# Patient Record
Sex: FEMALE | Race: OTHER | HISPANIC OR LATINO | URBAN - METROPOLITAN AREA
[De-identification: names, ages, dates, MRNs, and addresses within clinical notes are randomized per-mention and may not be internally consistent; named-entity substitution may affect disease eponyms.]

---

## 2024-01-03 ENCOUNTER — EMERGENCY (EMERGENCY)
Facility: HOSPITAL | Age: 64
LOS: 0 days | Discharge: HOME | End: 2024-01-04
Attending: EMERGENCY MEDICINE
Payer: SELF-PAY

## 2024-01-03 VITALS
OXYGEN SATURATION: 98 % | HEART RATE: 65 BPM | DIASTOLIC BLOOD PRESSURE: 78 MMHG | TEMPERATURE: 98 F | SYSTOLIC BLOOD PRESSURE: 160 MMHG | RESPIRATION RATE: 18 BRPM

## 2024-01-03 DIAGNOSIS — R51.9 HEADACHE, UNSPECIFIED: ICD-10-CM

## 2024-01-03 DIAGNOSIS — R42 DIZZINESS AND GIDDINESS: ICD-10-CM

## 2024-01-03 DIAGNOSIS — R55 SYNCOPE AND COLLAPSE: ICD-10-CM

## 2024-01-03 DIAGNOSIS — R07.1 CHEST PAIN ON BREATHING: ICD-10-CM

## 2024-01-03 DIAGNOSIS — I10 ESSENTIAL (PRIMARY) HYPERTENSION: ICD-10-CM

## 2024-01-03 DIAGNOSIS — Z98.891 HISTORY OF UTERINE SCAR FROM PREVIOUS SURGERY: Chronic | ICD-10-CM

## 2024-01-03 DIAGNOSIS — R06.02 SHORTNESS OF BREATH: ICD-10-CM

## 2024-01-03 LAB
ALBUMIN SERPL ELPH-MCNC: 4.4 G/DL — SIGNIFICANT CHANGE UP (ref 3.5–5.2)
ALBUMIN SERPL ELPH-MCNC: 4.4 G/DL — SIGNIFICANT CHANGE UP (ref 3.5–5.2)
ALP SERPL-CCNC: 108 U/L — SIGNIFICANT CHANGE UP (ref 30–115)
ALP SERPL-CCNC: 108 U/L — SIGNIFICANT CHANGE UP (ref 30–115)
ALT FLD-CCNC: 17 U/L — SIGNIFICANT CHANGE UP (ref 0–41)
ALT FLD-CCNC: 17 U/L — SIGNIFICANT CHANGE UP (ref 0–41)
ANION GAP SERPL CALC-SCNC: 10 MMOL/L — SIGNIFICANT CHANGE UP (ref 7–14)
ANION GAP SERPL CALC-SCNC: 10 MMOL/L — SIGNIFICANT CHANGE UP (ref 7–14)
AST SERPL-CCNC: 16 U/L — SIGNIFICANT CHANGE UP (ref 0–41)
AST SERPL-CCNC: 16 U/L — SIGNIFICANT CHANGE UP (ref 0–41)
BASOPHILS # BLD AUTO: 0.03 K/UL — SIGNIFICANT CHANGE UP (ref 0–0.2)
BASOPHILS # BLD AUTO: 0.03 K/UL — SIGNIFICANT CHANGE UP (ref 0–0.2)
BASOPHILS NFR BLD AUTO: 0.6 % — SIGNIFICANT CHANGE UP (ref 0–1)
BASOPHILS NFR BLD AUTO: 0.6 % — SIGNIFICANT CHANGE UP (ref 0–1)
BILIRUB SERPL-MCNC: 0.3 MG/DL — SIGNIFICANT CHANGE UP (ref 0.2–1.2)
BILIRUB SERPL-MCNC: 0.3 MG/DL — SIGNIFICANT CHANGE UP (ref 0.2–1.2)
BUN SERPL-MCNC: 10 MG/DL — SIGNIFICANT CHANGE UP (ref 10–20)
BUN SERPL-MCNC: 10 MG/DL — SIGNIFICANT CHANGE UP (ref 10–20)
CALCIUM SERPL-MCNC: 9.5 MG/DL — SIGNIFICANT CHANGE UP (ref 8.4–10.4)
CALCIUM SERPL-MCNC: 9.5 MG/DL — SIGNIFICANT CHANGE UP (ref 8.4–10.4)
CHLORIDE SERPL-SCNC: 107 MMOL/L — SIGNIFICANT CHANGE UP (ref 98–110)
CHLORIDE SERPL-SCNC: 107 MMOL/L — SIGNIFICANT CHANGE UP (ref 98–110)
CO2 SERPL-SCNC: 26 MMOL/L — SIGNIFICANT CHANGE UP (ref 17–32)
CO2 SERPL-SCNC: 26 MMOL/L — SIGNIFICANT CHANGE UP (ref 17–32)
CREAT SERPL-MCNC: 0.6 MG/DL — LOW (ref 0.7–1.5)
CREAT SERPL-MCNC: 0.6 MG/DL — LOW (ref 0.7–1.5)
D DIMER BLD IA.RAPID-MCNC: 272 NG/ML DDU — HIGH
D DIMER BLD IA.RAPID-MCNC: 272 NG/ML DDU — HIGH
EGFR: 101 ML/MIN/1.73M2 — SIGNIFICANT CHANGE UP
EGFR: 101 ML/MIN/1.73M2 — SIGNIFICANT CHANGE UP
EOSINOPHIL # BLD AUTO: 0.08 K/UL — SIGNIFICANT CHANGE UP (ref 0–0.7)
EOSINOPHIL # BLD AUTO: 0.08 K/UL — SIGNIFICANT CHANGE UP (ref 0–0.7)
EOSINOPHIL NFR BLD AUTO: 1.5 % — SIGNIFICANT CHANGE UP (ref 0–8)
EOSINOPHIL NFR BLD AUTO: 1.5 % — SIGNIFICANT CHANGE UP (ref 0–8)
GLUCOSE SERPL-MCNC: 99 MG/DL — SIGNIFICANT CHANGE UP (ref 70–99)
GLUCOSE SERPL-MCNC: 99 MG/DL — SIGNIFICANT CHANGE UP (ref 70–99)
HCT VFR BLD CALC: 40.7 % — SIGNIFICANT CHANGE UP (ref 37–47)
HCT VFR BLD CALC: 40.7 % — SIGNIFICANT CHANGE UP (ref 37–47)
HGB BLD-MCNC: 14 G/DL — SIGNIFICANT CHANGE UP (ref 12–16)
HGB BLD-MCNC: 14 G/DL — SIGNIFICANT CHANGE UP (ref 12–16)
IMM GRANULOCYTES NFR BLD AUTO: 0.4 % — HIGH (ref 0.1–0.3)
IMM GRANULOCYTES NFR BLD AUTO: 0.4 % — HIGH (ref 0.1–0.3)
LYMPHOCYTES # BLD AUTO: 1.68 K/UL — SIGNIFICANT CHANGE UP (ref 1.2–3.4)
LYMPHOCYTES # BLD AUTO: 1.68 K/UL — SIGNIFICANT CHANGE UP (ref 1.2–3.4)
LYMPHOCYTES # BLD AUTO: 30.9 % — SIGNIFICANT CHANGE UP (ref 20.5–51.1)
LYMPHOCYTES # BLD AUTO: 30.9 % — SIGNIFICANT CHANGE UP (ref 20.5–51.1)
MAGNESIUM SERPL-MCNC: 2.1 MG/DL — SIGNIFICANT CHANGE UP (ref 1.8–2.4)
MAGNESIUM SERPL-MCNC: 2.1 MG/DL — SIGNIFICANT CHANGE UP (ref 1.8–2.4)
MCHC RBC-ENTMCNC: 32.1 PG — HIGH (ref 27–31)
MCHC RBC-ENTMCNC: 32.1 PG — HIGH (ref 27–31)
MCHC RBC-ENTMCNC: 34.4 G/DL — SIGNIFICANT CHANGE UP (ref 32–37)
MCHC RBC-ENTMCNC: 34.4 G/DL — SIGNIFICANT CHANGE UP (ref 32–37)
MCV RBC AUTO: 93.3 FL — SIGNIFICANT CHANGE UP (ref 81–99)
MCV RBC AUTO: 93.3 FL — SIGNIFICANT CHANGE UP (ref 81–99)
MONOCYTES # BLD AUTO: 0.34 K/UL — SIGNIFICANT CHANGE UP (ref 0.1–0.6)
MONOCYTES # BLD AUTO: 0.34 K/UL — SIGNIFICANT CHANGE UP (ref 0.1–0.6)
MONOCYTES NFR BLD AUTO: 6.3 % — SIGNIFICANT CHANGE UP (ref 1.7–9.3)
MONOCYTES NFR BLD AUTO: 6.3 % — SIGNIFICANT CHANGE UP (ref 1.7–9.3)
NEUTROPHILS # BLD AUTO: 3.29 K/UL — SIGNIFICANT CHANGE UP (ref 1.4–6.5)
NEUTROPHILS # BLD AUTO: 3.29 K/UL — SIGNIFICANT CHANGE UP (ref 1.4–6.5)
NEUTROPHILS NFR BLD AUTO: 60.3 % — SIGNIFICANT CHANGE UP (ref 42.2–75.2)
NEUTROPHILS NFR BLD AUTO: 60.3 % — SIGNIFICANT CHANGE UP (ref 42.2–75.2)
NRBC # BLD: 0 /100 WBCS — SIGNIFICANT CHANGE UP (ref 0–0)
NRBC # BLD: 0 /100 WBCS — SIGNIFICANT CHANGE UP (ref 0–0)
NT-PROBNP SERPL-SCNC: 109 PG/ML — SIGNIFICANT CHANGE UP (ref 0–300)
NT-PROBNP SERPL-SCNC: 109 PG/ML — SIGNIFICANT CHANGE UP (ref 0–300)
PLATELET # BLD AUTO: 203 K/UL — SIGNIFICANT CHANGE UP (ref 130–400)
PLATELET # BLD AUTO: 203 K/UL — SIGNIFICANT CHANGE UP (ref 130–400)
PMV BLD: 9.6 FL — SIGNIFICANT CHANGE UP (ref 7.4–10.4)
PMV BLD: 9.6 FL — SIGNIFICANT CHANGE UP (ref 7.4–10.4)
POTASSIUM SERPL-MCNC: 4.7 MMOL/L — SIGNIFICANT CHANGE UP (ref 3.5–5)
POTASSIUM SERPL-MCNC: 4.7 MMOL/L — SIGNIFICANT CHANGE UP (ref 3.5–5)
POTASSIUM SERPL-SCNC: 4.7 MMOL/L — SIGNIFICANT CHANGE UP (ref 3.5–5)
POTASSIUM SERPL-SCNC: 4.7 MMOL/L — SIGNIFICANT CHANGE UP (ref 3.5–5)
PROT SERPL-MCNC: 7.6 G/DL — SIGNIFICANT CHANGE UP (ref 6–8)
PROT SERPL-MCNC: 7.6 G/DL — SIGNIFICANT CHANGE UP (ref 6–8)
RBC # BLD: 4.36 M/UL — SIGNIFICANT CHANGE UP (ref 4.2–5.4)
RBC # BLD: 4.36 M/UL — SIGNIFICANT CHANGE UP (ref 4.2–5.4)
RBC # FLD: 12.5 % — SIGNIFICANT CHANGE UP (ref 11.5–14.5)
RBC # FLD: 12.5 % — SIGNIFICANT CHANGE UP (ref 11.5–14.5)
SODIUM SERPL-SCNC: 143 MMOL/L — SIGNIFICANT CHANGE UP (ref 135–146)
SODIUM SERPL-SCNC: 143 MMOL/L — SIGNIFICANT CHANGE UP (ref 135–146)
TROPONIN T, HIGH SENSITIVITY RESULT: 7 NG/L — SIGNIFICANT CHANGE UP (ref 6–13)
TROPONIN T, HIGH SENSITIVITY RESULT: 7 NG/L — SIGNIFICANT CHANGE UP (ref 6–13)
WBC # BLD: 5.44 K/UL — SIGNIFICANT CHANGE UP (ref 4.8–10.8)
WBC # BLD: 5.44 K/UL — SIGNIFICANT CHANGE UP (ref 4.8–10.8)
WBC # FLD AUTO: 5.44 K/UL — SIGNIFICANT CHANGE UP (ref 4.8–10.8)
WBC # FLD AUTO: 5.44 K/UL — SIGNIFICANT CHANGE UP (ref 4.8–10.8)

## 2024-01-03 PROCEDURE — G0378: CPT

## 2024-01-03 PROCEDURE — 71045 X-RAY EXAM CHEST 1 VIEW: CPT | Mod: 26

## 2024-01-03 PROCEDURE — 99285 EMERGENCY DEPT VISIT HI MDM: CPT

## 2024-01-03 PROCEDURE — 80053 COMPREHEN METABOLIC PANEL: CPT

## 2024-01-03 PROCEDURE — 85379 FIBRIN DEGRADATION QUANT: CPT

## 2024-01-03 PROCEDURE — 85025 COMPLETE CBC W/AUTO DIFF WBC: CPT

## 2024-01-03 PROCEDURE — 70450 CT HEAD/BRAIN W/O DYE: CPT | Mod: MA

## 2024-01-03 PROCEDURE — 99223 1ST HOSP IP/OBS HIGH 75: CPT

## 2024-01-03 PROCEDURE — 71275 CT ANGIOGRAPHY CHEST: CPT | Mod: MA

## 2024-01-03 PROCEDURE — 84484 ASSAY OF TROPONIN QUANT: CPT

## 2024-01-03 PROCEDURE — 93010 ELECTROCARDIOGRAM REPORT: CPT | Mod: 77

## 2024-01-03 PROCEDURE — 93010 ELECTROCARDIOGRAM REPORT: CPT

## 2024-01-03 PROCEDURE — 71045 X-RAY EXAM CHEST 1 VIEW: CPT

## 2024-01-03 PROCEDURE — 78452 HT MUSCLE IMAGE SPECT MULT: CPT | Mod: MA

## 2024-01-03 PROCEDURE — A9500: CPT

## 2024-01-03 PROCEDURE — 93306 TTE W/DOPPLER COMPLETE: CPT

## 2024-01-03 PROCEDURE — 93017 CV STRESS TEST TRACING ONLY: CPT

## 2024-01-03 PROCEDURE — 36415 COLL VENOUS BLD VENIPUNCTURE: CPT

## 2024-01-03 PROCEDURE — 83735 ASSAY OF MAGNESIUM: CPT

## 2024-01-03 PROCEDURE — 70450 CT HEAD/BRAIN W/O DYE: CPT | Mod: 26,MA

## 2024-01-03 PROCEDURE — 83880 ASSAY OF NATRIURETIC PEPTIDE: CPT

## 2024-01-03 PROCEDURE — 93005 ELECTROCARDIOGRAM TRACING: CPT

## 2024-01-03 PROCEDURE — 71275 CT ANGIOGRAPHY CHEST: CPT | Mod: 26,MA

## 2024-01-03 RX ORDER — ADENOSINE 3 MG/ML
60 INJECTION INTRAVENOUS ONCE
Refills: 0 | Status: DISCONTINUED | OUTPATIENT
Start: 2024-01-03 | End: 2024-01-04

## 2024-01-03 RX ORDER — LOSARTAN POTASSIUM 100 MG/1
50 TABLET, FILM COATED ORAL DAILY
Refills: 0 | Status: DISCONTINUED | OUTPATIENT
Start: 2024-01-04 | End: 2024-01-04

## 2024-01-03 RX ORDER — AMLODIPINE BESYLATE 2.5 MG/1
5 TABLET ORAL DAILY
Refills: 0 | Status: DISCONTINUED | OUTPATIENT
Start: 2024-01-04 | End: 2024-01-04

## 2024-01-03 NOTE — ED PROVIDER NOTE - ATTENDING APP SHARED VISIT CONTRIBUTION OF CARE
64 yo f hx htn  pt presents for eval of syncope. syncopal event occurred at 2pm.  pt states she felt frontal headache, dizzy and L sided cp during event. cp worse w/ inspiration. last cardiac eval was 3 yrs ago.  no ap, n,v,d,neck pain. no focal numbness/weakness.      vss  gen- NAD, aaox3  card-rrr  lungs-ctab, no wheezing or rhonchi  abd-sntnd, no guarding or rebound  neuro- full str/sensation, cn ii-xii grossly intact, normal coordination   LE- no calf pain/swelling/tenderness b/l 62 yo f hx htn  pt presents for eval of syncope. syncopal event occurred at 2pm.  pt states she felt frontal headache, dizzy and L sided cp during event. cp worse w/ inspiration. last cardiac eval was 3 yrs ago.  no ap, n,v,d,neck pain. no focal numbness/weakness.      vss  gen- NAD, aaox3  card-rrr  lungs-ctab, no wheezing or rhonchi  abd-sntnd, no guarding or rebound  neuro- full str/sensation, cn ii-xii grossly intact, normal coordination   LE- no calf pain/swelling/tenderness b/l

## 2024-01-03 NOTE — ED ADULT NURSE NOTE - NSFALLUNIVINTERV_ED_ALL_ED
Bed/Stretcher in lowest position, wheels locked, appropriate side rails in place/Call bell, personal items and telephone in reach/Instruct patient to call for assistance before getting out of bed/chair/stretcher/Non-slip footwear applied when patient is off stretcher/[place] to call system/Physically safe environment - no spills, clutter or unnecessary equipment/Purposeful proactive rounding/Room/bathroom lighting operational, light cord in reach Bed/Stretcher in lowest position, wheels locked, appropriate side rails in place/Call bell, personal items and telephone in reach/Instruct patient to call for assistance before getting out of bed/chair/stretcher/Non-slip footwear applied when patient is off stretcher/[place] to call system/Physically safe environment - no spills, clutter or unnecessary equipment/Purposeful proactive rounding/Room/bathroom lighting operational, light cord in reach

## 2024-01-03 NOTE — ED PROVIDER NOTE - OBJECTIVE STATEMENT
I used  Shasha [id number].  63-year-old Ugandan-speaking female with a past medical history of hypertension presents to the ED for evaluation of syncopal episode that occurred at 2 PM while at her daughter's house.  Patient reports frontal headache, dizziness, and constant left-sided pleuritic chest pain that began today.  Patient reports she last saw her cardiologist in [place] about 3 years ago.  Patient reports she had a similar episode but at that time and her cardiologist did "tests on her arm and started her on telmisartan 40 mg once daily, and amlodipine 5 mg once daily."Patient reports she has shortness of breath earlier today.  Patient denies visual changes, neck pain, back pain, abdominal pain, nausea, vomiting, diarrhea, constipation, weakness, numbness, tingling, urinary or bowel retention or incontinence, recent head trauma, family history of CAD, cigarette smoking, or use of blood thinners. I used  Shasha [id number].  63-year-old Scottish-speaking female with a past medical history of hypertension presents to the ED for evaluation of syncopal episode that occurred at 2 PM while at her daughter's house.  Patient reports frontal headache, dizziness, and constant left-sided pleuritic chest pain that began today.  Patient reports she last saw her cardiologist in [place] about 3 years ago.  Patient reports she had a similar episode but at that time and her cardiologist did "tests on her arm and started her on telmisartan 40 mg once daily, and amlodipine 5 mg once daily."Patient reports she has shortness of breath earlier today.  Patient denies visual changes, neck pain, back pain, abdominal pain, nausea, vomiting, diarrhea, constipation, weakness, numbness, tingling, urinary or bowel retention or incontinence, recent head trauma, family history of CAD, cigarette smoking, or use of blood thinners. I used  Shasha [id number].  63-year-old Japanese-speaking female with a past medical history of hypertension presents to the ED for evaluation of syncopal episode that occurred at 2 PM while at her daughter's house.  Patient reports frontal headache, dizziness, and constant left-sided pleuritic chest pain that began today.  Patient reports she last saw her cardiologist in [place] about 3 years ago.  Patient reports she had a similar episode but at that time and her cardiologist did "tests on her arm and started her on telmisartan 40 mg once daily, and amlodipine 5 mg once daily."Patient reports she has shortness of breath earlier today.  Patient denies visual changes, neck pain, back pain, abdominal pain, nausea, vomiting, diarrhea, constipation, weakness, numbness, tingling, urinary or bowel retention or incontinence, recent head trauma, family history of CAD, cigarette smoking, or use of blood thinners.

## 2024-01-03 NOTE — ED CDU PROVIDER INITIAL DAY NOTE - ATTENDING SHARED VISIT SELECTOR YES
on active chemotherapy monitoring and management of side effects, cytopenias, infections, bleeding and other complications.
Yes

## 2024-01-03 NOTE — ED CDU PROVIDER INITIAL DAY NOTE - CLINICAL SUMMARY MEDICAL DECISION MAKING FREE TEXT BOX
no 53-year-old woman with history of hypertension was placed in CDU for evaluation of syncope after headache associated with pleuritic chest pain.  Patient is visiting from [place].  Had a similar episode about 3 years ago after which she was started on antihypertensives.  ED workup was unremarkable.  Patient comfortable on my eval.  Exam as noted.  Plan is for telemetry, serial EKG and enzymes, stress test, echo. 53-year-old woman with history of hypertension was placed in CDU for evaluation of syncope after headache associated with pleuritic chest pain.  Patient is visiting from [place].  Had a similar episode about 3 years ago after which she was started on antihypertensives.  ED workup was unremarkable.  Patient comfortable on my eval.  Exam as noted.  Plan is for telemetry, serial EKG and enzymes, stress test, echo.

## 2024-01-03 NOTE — ED CDU PROVIDER INITIAL DAY NOTE - PHYSICAL EXAMINATION
Physical Exam    Vital Signs: I have reviewed the initial vital signs.  Constitutional: well-nourished, appears stated age, no acute distress  Eyes: Conjunctiva pink, Sclera clear, PERRLA, EOMI without pain. no nystagmus.   Cardiovascular: S1 and S2, regular rate, regular rhythm, well-perfused extremities, radial pulses equal and 2+ b/l.   Respiratory: unlabored respiratory effort, clear to auscultation bilaterally no wheezing, rales and rhonchi. pt is speaking full sentences. no accessory muscle use. no chest wall crepitus or tenderness. no flail chest.   Gastrointestinal: soft, non-tender, nondistended abdomen, no pulsatile mass, no rebound, no guarding  Musculoskeletal: FROM of b/l upper and lower extremities, no lower extremity edema, no calf tenderness  Integumentary: warm, dry, no rash  Neurologic: awake, alert, cranial nerves II-XII grossly intact, extremities’ motor and sensory functions grossly intact. finger to nose intact. negative pronator drift. 5/5 strength throughout.   Psychiatric: appropriate mood, appropriate affect

## 2024-01-03 NOTE — ED CDU PROVIDER INITIAL DAY NOTE - OBJECTIVE STATEMENT
I used  Shasha [id number].  63-year-old Costa Rican-speaking female with a past medical history of hypertension presents to the ED for evaluation of syncopal episode that occurred at 2 PM while at her daughter's house.  Patient reports frontal headache, dizziness, and constant left-sided pleuritic chest pain that began today.  Patient reports she last saw her cardiologist in [place] about 3 years ago.  Patient reports she had a similar episode but at that time and her cardiologist did "tests on her arm and started her on telmisartan 40 mg once daily, and amlodipine 5 mg once daily."Patient reports she has shortness of breath earlier today.  Patient denies visual changes, neck pain, back pain, abdominal pain, nausea, vomiting, diarrhea, constipation, weakness, numbness, tingling, urinary or bowel retention or incontinence, recent head trauma, family history of CAD, cigarette smoking, or use of blood thinners. I used  Shasha [id number].  63-year-old Slovenian-speaking female with a past medical history of hypertension presents to the ED for evaluation of syncopal episode that occurred at 2 PM while at her daughter's house.  Patient reports frontal headache, dizziness, and constant left-sided pleuritic chest pain that began today.  Patient reports she last saw her cardiologist in [place] about 3 years ago.  Patient reports she had a similar episode but at that time and her cardiologist did "tests on her arm and started her on telmisartan 40 mg once daily, and amlodipine 5 mg once daily."Patient reports she has shortness of breath earlier today.  Patient denies visual changes, neck pain, back pain, abdominal pain, nausea, vomiting, diarrhea, constipation, weakness, numbness, tingling, urinary or bowel retention or incontinence, recent head trauma, family history of CAD, cigarette smoking, or use of blood thinners.

## 2024-01-03 NOTE — ED PROVIDER NOTE - CLINICAL SUMMARY MEDICAL DECISION MAKING FREE TEXT BOX
Throughout ED observation period, pt remained clinically and hemodynamically stable.  labs w/o acute findings, dimer mildly elevated but cta negative  ekg nonischaemic, trop neg, cth neg  will obs for cp/syncope w/u

## 2024-01-04 VITALS
HEART RATE: 78 BPM | SYSTOLIC BLOOD PRESSURE: 99 MMHG | DIASTOLIC BLOOD PRESSURE: 61 MMHG | RESPIRATION RATE: 19 BRPM | TEMPERATURE: 98 F | OXYGEN SATURATION: 95 %

## 2024-01-04 LAB
TROPONIN T, HIGH SENSITIVITY RESULT: <6 NG/L — SIGNIFICANT CHANGE UP (ref 6–13)
TROPONIN T, HIGH SENSITIVITY RESULT: <6 NG/L — SIGNIFICANT CHANGE UP (ref 6–13)

## 2024-01-04 PROCEDURE — 93018 CV STRESS TEST I&R ONLY: CPT

## 2024-01-04 PROCEDURE — 93016 CV STRESS TEST SUPVJ ONLY: CPT

## 2024-01-04 PROCEDURE — 93306 TTE W/DOPPLER COMPLETE: CPT | Mod: 26

## 2024-01-04 PROCEDURE — 99239 HOSP IP/OBS DSCHRG MGMT >30: CPT

## 2024-01-04 PROCEDURE — 78452 HT MUSCLE IMAGE SPECT MULT: CPT | Mod: 26,MA

## 2024-01-04 RX ORDER — ACETAMINOPHEN 500 MG
650 TABLET ORAL ONCE
Refills: 0 | Status: DISCONTINUED | OUTPATIENT
Start: 2024-01-04 | End: 2024-01-04

## 2024-01-04 RX ORDER — REGADENOSON 0.08 MG/ML
0.4 INJECTION, SOLUTION INTRAVENOUS ONCE
Refills: 0 | Status: DISCONTINUED | OUTPATIENT
Start: 2024-01-04 | End: 2024-01-04

## 2024-01-04 RX ADMIN — AMLODIPINE BESYLATE 5 MILLIGRAM(S): 2.5 TABLET ORAL at 05:50

## 2024-01-04 RX ADMIN — LOSARTAN POTASSIUM 50 MILLIGRAM(S): 100 TABLET, FILM COATED ORAL at 05:50

## 2024-01-04 NOTE — ED CDU PROVIDER DISPOSITION NOTE - NSFOLLOWUPCLINICS_GEN_ALL_ED_FT
[facility]  Medicine  [address], [place]   Phone: [phone]  Fax:   Follow Up Time: 1-3 Days     [facility]  Medicine  [address], [place]   Phone: [phone]  Fax:   Follow Up Time: 1-3 Days

## 2024-01-04 NOTE — ED CDU PROVIDER DISPOSITION NOTE - NSFOLLOWUPINSTRUCTIONS_ED_ALL_ED_FT
Acerca de mikayla mary alice  El dolor de pecho se siente en cualquier parte del cuerpo entre el kobe y el estómago. Es posible que sienta dolor o presión. Los pulmones y el corazón pueden ocasionar dolor. Los músculos, los tendones y los nervios del pecho también pueden ocasionarle dolor. El dolor de pecho puede deberse a un problema de nanette grave o a algo no tan grave.   El tratamiento dependerá de la causa del dolor de pecho.   Image Filename  La imagen muestra a un hombre con signos de un ataque cardíaco. Estos incluyen sudoración, dificultad para respirar, dolor o presión en el pecho, palidez, dolor en los maxilares con irradiación a un brazo y náuseas y vómitos.    Image Not Available  La imagen muestra a omer josie con signos de un ataque cardíaco. Estos incluyen falta de energía, dificultad para respirar, ardor de estómago, mareos, debilidad y náuseas y vómitos.    Image Not Available  ¿Qué cuidados se necesitan en casa?  Pregunte al médico qué debe hacer al llegar a casa. Asegúrese de hacer preguntas si no entiende lo que el médico explica. Así sabrá qué debe hacer.    Descanse mucho. Pregúntele al médico el nivel de actividad y descanso que necesita.    Es posible que el médico le administre medicamentos para el dolor. Asegúrese de nicole todos los medicamentos que el médico le indique.    Evite situaciones que pueden enojarlo o estresarlo.    ¿Qué cuidados se necesitan en la etapa de seguimiento?  El médico puede pedirle que visite el consultorio para evaluar guardado progreso. No falte a estas citas.    El médico le dirá si es necesario realizarse otras pruebas.    El médico le dirá si es necesario nelson a un especialista, por ejemplo un cardiólogo.    ¿Qué medicamentos pueden ser necesarios?  Si el dolor en el pecho es causado por un problema de corazón, es posible que el médico le recete medicamentos para:   Diluir la mike    Disolver un coágulo de mike    Reducir el colesterol    Aminorar el trabajo que realiza el corazón    Corregir o prevenir omer frecuencia cardíaca anormal    Disminuir la presión arterial    Incrementar el flujo sanguíneo que va al músculo cardíaco    Relajar al corazón y ayudar a prevenir espasmos en las arterias    Si el dolor en el pecho no es causado por un problema de corazón, es posible que el médico le recete medicamentos para:   Aliviar el dolor    Tratar problemas del estómago    Ayudar con la respiración    Ayudar a que se relaje    Controlar la tos    ¿Estará restringida la actividad física?  Evite las actividades que pueden desencadenar dolor de pecho.    Es posible que deba ser menos activo al principio, para luego retomar poco a poco los niveles normales. Hable con el médico acerca de cuál es la cantidad adecuada de ejercicio para usted.    ¿Qué cambios en la dieta son necesarios?  Pregunte al médico si debe hacer cambios en guardado dieta.   ¿Qué problemas podrían surgir?  Ataque cardíaco    Otros problemas de corazón    Coágulo de mike en los pulmones    ¿Cómo puede prevenirse mikayla problema de nanette?  Si fuma, deje de hacerlo.    Mantenga un peso mandie. Si tiene mucho sobrepeso, baje de peso.    Mantenga bajo control la presión, el colesterol y el azúcar en mike elevada (diabetes).    Marilyn ejercicio de 3 a 4 veces por semana agnes mínimo.    Coma mucha fibra, frutas, almidón y verduras y evite los alimentos que son ricos en grasas.    ¿Cuándo ykree llamar al médico?  Active el sistema médico de emergencia de inmediato si tiene signos de ataque cardíaco. Llame al 911 si se encuentra en [place] o [place]. Mientras más pronto empiece el tratamiento, mayores serán las posibilidades de recuperación. Llame inmediatamente para recibir ayuda de emergencia si presenta:   Signos de un ataque cardíaco:    Dolor de pecho.    Problemas para respirar.    Pulso rápido.    Mareos    Nunca tuvo dolor de pecho antes y no desaparece luego de descansar por 5 minutos. No conduzca hasta el hospital, pida que alguien lo lleve. El personal de rescate de emergencia puede empezar a tratarle en cuanto llegue.    Si el médico le administró nitroglicerina para el dolor cardíaco, siéntese o recuéstese.   Coloque la píldora debajo de la lengua y deje disolver. Si tiene la boca seca, tome un sorbo de agua.    Espere 5 minutos si el dolor de pecho no desaparece, pida ayuda y coloque otra píldora de nitroglicerina debajo de la lengua.    A veces, el médico le indicará nicole omer tercera píldora luego de 5 minutos.    Repita la enseñanza con armando propias palabras (Teach Back): Ayudándolo a comprender  El método de enseñanza recíproca ayuda a comprender la información que se le está proporcionando. La idea es simple. Después de hablar con el personal, cuente con armando propias palabras lo que se le acaba de comunicar. [place] le asegura al personal que se harris cubierto todos los aspectos necesarios de forma tammy. También ayuda a explicar ciertas cosas que podrían yair sido un poco confusas. Antes de irse a guardado casa, asegúrese de poder llevar a cabo lo siguiente:   Hablar sobre mi dolor.    Decir cuándo puedo volver a mis actividades normales.    Decir qué haré en luigi de tener síntomas de un ataque cardíaco o accidente cerebrovascular.    ¿Dónde puedo obtener más información?  American Heart Association   http://www.[email or web address]/HEARTORG/Conditions/HeartAttack/AboutHeartAttacks/About-Heart-Attacks_[facility]_002038_Article.jsp   http://www.heart.org/HEARTORG/Conditions/HeartAttack/SymptomsDiagnosisofHeartAttack/Angina-Chest-Pain_[facility]_450308_Article.jsp   FamilyDoctor.org   http://familydoctor.org/familydoctor/en/diseases-conditions/[email or web address].printerview.all.html   National Heart Lung and Blood [place]   http://www.nhlbi.nih.gov/health/health-topics/topics/angina/   Exención de responsabilidad y uso de la información del consumidor  Esta información no constituye asesoramiento médico específico y no reemplaza la información que usted recibe de guardado proveedor de atención médica. Mikayla es tan solo un resumen de la información general. NO incluye la información completa acerca de afecciones, enfermedades, lesiones, pruebas, procedimientos, tratamientos, terapias, instrucciones para el ruddy o estilos de siomara que apliquen en guardado luigi. Debe hablar con el proveedor de atención médica para obtener información completa sobre guardado nanette y las opciones de tratamiento. No se debe utilizar esta información para decidir si acepta o no el consejo, instrucciones o recomendaciones del proveedor de atención médica. Solamente el proveedor de atención médica cuenta con los conocimientos y la capacitación para brindarle el mejor consejo. Acerca de mikayla mary alice  El dolor de pecho se siente en cualquier parte del cuerpo entre el kobe y el estómago. Es posible que sienta dolor o presión. Los pulmones y el corazón pueden ocasionar dolor. Los músculos, los tendones y los nervios del pecho también pueden ocasionarle dolor. El dolor de pecho puede deberse a un problema de nanette grave o a algo no tan grave.   El tratamiento dependerá de la causa del dolor de pecho.   Image Filename  La imagen muestra a un hombre con signos de un ataque cardíaco. Estos incluyen sudoración, dificultad para respirar, dolor o presión en el pecho, palidez, dolor en los maxilares con irradiación a un brazo y náuseas y vómitos.    Image Not Available  La imagen muestra a omer josie con signos de un ataque cardíaco. Estos incluyen falta de energía, dificultad para respirar, ardor de estómago, mareos, debilidad y náuseas y vómitos.    Image Not Available  ¿Qué cuidados se necesitan en casa?  Pregunte al médico qué debe hacer al llegar a casa. Asegúrese de hacer preguntas si no entiende lo que el médico explica. Así sabrá qué debe hacer.    Descanse mucho. Pregúntele al médico el nivel de actividad y descanso que necesita.    Es posible que el médico le administre medicamentos para el dolor. Asegúrese de nicole todos los medicamentos que el médico le indique.    Evite situaciones que pueden enojarlo o estresarlo.    ¿Qué cuidados se necesitan en la etapa de seguimiento?  El médico puede pedirle que visite el consultorio para evaluar guardado progreso. No falte a estas citas.    El médico le dirá si es necesario realizarse otras pruebas.    El médico le dirá si es necesario nelson a un especialista, por ejemplo un cardiólogo.    ¿Qué medicamentos pueden ser necesarios?  Si el dolor en el pecho es causado por un problema de corazón, es posible que el médico le recete medicamentos para:   Diluir la mike    Disolver un coágulo de mike    Reducir el colesterol    Aminorar el trabajo que realiza el corazón    Corregir o prevenir omer frecuencia cardíaca anormal    Disminuir la presión arterial    Incrementar el flujo sanguíneo que va al músculo cardíaco    Relajar al corazón y ayudar a prevenir espasmos en las arterias    Si el dolor en el pecho no es causado por un problema de corazón, es posible que el médico le recete medicamentos para:   Aliviar el dolor    Tratar problemas del estómago    Ayudar con la respiración    Ayudar a que se relaje    Controlar la tos    ¿Estará restringida la actividad física?  Evite las actividades que pueden desencadenar dolor de pecho.    Es posible que deba ser menos activo al principio, para luego retomar poco a poco los niveles normales. Hable con el médico acerca de cuál es la cantidad adecuada de ejercicio para usted.    ¿Qué cambios en la dieta son necesarios?  Pregunte al médico si debe hacer cambios en guardado dieta.   ¿Qué problemas podrían surgir?  Ataque cardíaco    Otros problemas de corazón    Coágulo de mike en los pulmones    ¿Cómo puede prevenirse mikayla problema de nanette?  Si fuma, deje de hacerlo.    Mantenga un peso mandie. Si tiene mucho sobrepeso, baje de peso.    Mantenga bajo control la presión, el colesterol y el azúcar en mike elevada (diabetes).    Marilyn ejercicio de 3 a 4 veces por semana agnes mínimo.    Coma mucha fibra, frutas, almidón y verduras y evite los alimentos que son ricos en grasas.    ¿Cuándo kyree llamar al médico?  Active el sistema médico de emergencia de inmediato si tiene signos de ataque cardíaco. Llame al 911 si se encuentra en [place] o [place]. Mientras más pronto empiece el tratamiento, mayores serán las posibilidades de recuperación. Llame inmediatamente para recibir ayuda de emergencia si presenta:   Signos de un ataque cardíaco:    Dolor de pecho.    Problemas para respirar.    Pulso rápido.    Mareos    Nunca tuvo dolor de pecho antes y no desaparece luego de descansar por 5 minutos. No conduzca hasta el hospital, pida que alguien lo lleve. El personal de rescate de emergencia puede empezar a tratarle en cuanto llegue.    Si el médico le administró nitroglicerina para el dolor cardíaco, siéntese o recuéstese.   Coloque la píldora debajo de la lengua y deje disolver. Si tiene la boca seca, tome un sorbo de agua.    Espere 5 minutos si el dolor de pecho no desaparece, pida ayuda y coloque otra píldora de nitroglicerina debajo de la lengua.    A veces, el médico le indicará nicole omer tercera píldora luego de 5 minutos.    Repita la enseñanza con armando propias palabras (Teach Back): Ayudándolo a comprender  El método de enseñanza recíproca ayuda a comprender la información que se le está proporcionando. La idea es simple. Después de hablar con el personal, cuente con armando propias palabras lo que se le acaba de comunicar. [place] le asegura al personal que se harris cubierto todos los aspectos necesarios de forma tammy. También ayuda a explicar ciertas cosas que podrían yair sido un poco confusas. Antes de irse a guardado casa, asegúrese de poder llevar a cabo lo siguiente:   Hablar sobre mi dolor.    Decir cuándo puedo volver a mis actividades normales.    Decir qué haré en luigi de tener síntomas de un ataque cardíaco o accidente cerebrovascular.    ¿Dónde puedo obtener más información?  American Heart Association   http://www.heart.org/HEARTORG/Conditions/HeartAttack/AboutHeartAttacks/About-Heart-Attacks_[facility]_002038_Article.jsp   http://www.heart.org/HEARTORG/Conditions/HeartAttack/SymptomsDiagnosisofHeartAttack/Angina-Chest-Pain_[facility]_450308_Article.jsp   FamilyDoctor.org   http://familydoctor.org/familydoctor/en/diseases-conditions/[email or web address].printerview.all.html   National Heart Lung and Blood [place]   http://www.nhlbi.nih.gov/health/health-topics/topics/angina/   Exención de responsabilidad y uso de la información del consumidor  Esta información no constituye asesoramiento médico específico y no reemplaza la información que usted recibe de guardado proveedor de atención médica. Mikayal es tan solo un resumen de la información general. NO incluye la información completa acerca de afecciones, enfermedades, lesiones, pruebas, procedimientos, tratamientos, terapias, instrucciones para el ruddy o estilos de siomara que apliquen en guardado luigi. Debe hablar con el proveedor de atención médica para obtener información completa sobre guardado nanette y las opciones de tratamiento. No se debe utilizar esta información para decidir si acepta o no el consejo, instrucciones o recomendaciones del proveedor de atención médica. Solamente el proveedor de atención médica cuenta con los conocimientos y la capacitación para brindarle el mejor consejo.

## 2024-01-04 NOTE — ED CDU PROVIDER DISPOSITION NOTE - PATIENT PORTAL LINK FT
You can access the FollowMyHealth Patient Portal offered by [facility] by registering at the following website: [email or web address]. By joining "[employer]’s FollowMyHealth portal, you will also be able to view your health information using other applications (apps) compatible with our system. You can access the FollowMyHealth Patient Portal offered by [facility] by registering at the following website: [email or web address]. By joining [employer]’s FollowMyHealth portal, you will also be able to view your health information using other applications (apps) compatible with our system.

## 2024-01-04 NOTE — ED CDU PROVIDER DISPOSITION NOTE - CLINICAL COURSE
53-year-old woman with history of hypertension was placed in CDU for evaluation of syncope after headache associated with pleuritic chest pain.  Patient is visiting from [place].  Had a similar episode about 3 years ago after which she was started on antihypertensives.  ED workup was unremarkable.  Patient comfortable on my eval.  Exam as noted. Pt was monitored without incident; repeat EKG and enzymes reassuring. Echo revealed mild-moderate valve disease; stress test negative for ischemia. Results were d/w pt and family and she will f/u medical clinic and w cardiology 53-year-old woman with history of hypertension was placed in CDU for evaluation of syncope after headache associated with pleuritic chest pain.  Patient is visiting from [place].  Had a similar episode about 3 years ago after which she was started on antihypertensives.  ED workup was unremarkable.  Patient comfortable on my eval.  Exam as noted. Pt was monitored without incident; repeat EKG and enzymes reassuring. Echo revealed mild-moderate valve disease; stress test negative for ischemia. Results were d/w pt and family and she will f/u medical clinic and w cardiology

## 2024-01-04 NOTE — ED CDU PROVIDER SUBSEQUENT DAY NOTE - PROGRESS NOTE DETAILS
Received sign out this AM. Patient currently at nuclear stress testing. Echo completed this AM. Spoke with patient, daughters and son re: all labs results and imaging results. Patient is feeling well at present. Daughter states that patient is from [place] and plans on returning there in three days. She will follow-up with her physicians there however will also provide clinic information. Spoke with patient, daughters and son re: all labs results and imaging results. Patient is feeling well at present. Daughter states that patient is from [place] and plans on returning there in three days. She will follow-up with her physicians there however will also provide clinic information.

## 2024-01-04 NOTE — ED CDU PROVIDER SUBSEQUENT DAY NOTE - CLINICAL SUMMARY MEDICAL DECISION MAKING FREE TEXT BOX
53-year-old woman with history of hypertension was placed in CDU for evaluation of syncope after headache associated with pleuritic chest pain.  Patient is visiting from [place].  Had a similar episode about 3 years ago after which she was started on antihypertensives.  ED workup was unremarkable.  Patient comfortable on my eval.  Exam as noted. Pt was monitored without incident; repeat EKG and enzymes unchanged. Pt comfortable awaiting echo/nuc. 53-year-old woman with history of hypertension was placed in CDU for evaluation of syncope after headache associated with pleuritic chest pain.  Patient is visiting from [place].  Had a similar episode about 3 years ago after which she was started on antihypertensives.  ED workup was unremarkable.  Patient comfortable on my eval.  Exam as noted. Pt was monitored without incident; repeat EKG and enzymes unchanged. Pt comfortable awaiting echo/nuc.

## 2024-03-17 NOTE — ED PROVIDER NOTE - PHYSICAL EXAMINATION
Physical Exam    Vital Signs: I have reviewed the initial vital signs.  Constitutional: well-nourished, appears stated age, no acute distress  Eyes: Conjunctiva pink, Sclera clear, PERRLA, EOMI without pain. no nystagmus.   Cardiovascular: S1 and S2, regular rate, regular rhythm, well-perfused extremities, radial pulses equal and 2+ b/l.   Respiratory: unlabored respiratory effort, clear to auscultation bilaterally no wheezing, rales and rhonchi. pt is speaking full sentences. no accessory muscle use. no chest wall crepitus or tenderness. no flail chest.   Gastrointestinal: soft, non-tender, nondistended abdomen, no pulsatile mass, no rebound, no guarding  Musculoskeletal: FROM of b/l upper and lower extremities, no lower extremity edema, no calf tenderness  Integumentary: warm, dry, no rash  Neurologic: awake, alert, cranial nerves II-XII grossly intact, extremities’ motor and sensory functions grossly intact. finger to nose intact. negative pronator drift. 5/5 strength throughout.   Psychiatric: appropriate mood, appropriate affect no